# Patient Record
(demographics unavailable — no encounter records)

---

## 2024-11-19 NOTE — PHYSICAL EXAM
[General Appearance - Alert] : alert [General Appearance - In No Acute Distress] : in no acute distress [Oriented To Time, Place, And Person] : oriented to person, place, and time [Impaired Insight] : insight and judgment were intact [Affect] : the affect was normal [Person] : oriented to person [Place] : oriented to place [Time] : oriented to time [Concentration Intact] : normal concentrating ability [Visual Intact] : visual attention was ~T not ~L decreased [Naming Objects] : no difficulty naming common objects [Repeating Phrases] : no difficulty repeating a phrase [Writing A Sentence] : no difficulty writing a sentence [Fluency] : fluency intact [Comprehension] : comprehension intact [Reading] : reading intact [Past History] : adequate knowledge of personal past history [Cranial Nerves Optic (II)] : visual acuity intact bilaterally,  visual fields full to confrontation, pupils equal round and reactive to light [Cranial Nerves Oculomotor (III)] : extraocular motion intact [Cranial Nerves Trigeminal (V)] : facial sensation intact symmetrically [Cranial Nerves Facial (VII)] : face symmetrical [Cranial Nerves Vestibulocochlear (VIII)] : hearing was intact bilaterally [Cranial Nerves Glossopharyngeal (IX)] : tongue and palate midline [Cranial Nerves Accessory (XI - Cranial And Spinal)] : head turning and shoulder shrug symmetric [Cranial Nerves Hypoglossal (XII)] : there was no tongue deviation with protrusion [No Muscle Atrophy] : normal bulk in all four extremities [Hemiparesis Of Left Side] : hemiparesis was present on the left [Motor Strength Upper Extremities Left] : there was weakness of the left upper extremity [Motor Strength Lower Extremities Left] : there was weakness of the left lower extremity [Sensation Tactile Decrease] : light touch was intact [Limited Balance] : the patient's balance was impaired [Past-pointing] : there was no past-pointing [Tremor] : no tremor present [2+] : Ankle jerk left 2+ [Plantar Reflex Right Only] : normal on the right [Plantar Reflex Left Only] : normal on the left [FreeTextEntry1] : left visual field deficit

## 2024-11-19 NOTE — HISTORY OF PRESENT ILLNESS
[FreeTextEntry1] : 72M w/ PMH of HTN, early onset dementia, CAD s/p PCI, CVA s/p residual L sided weakness, BPH, former smoker- COPD is coming in for post hospital discharge follow up for seizures and other complaints.  Patient had coronary stents put in 2021; followed by right MCA infarct - treated in [place]; we do not have records from his hospitalization. Patient had COVID  6/18 - possible seizure - found down by family member - loss of consciousness for unknown period of time - had convulsions noticed by family member - taken to [facility]   11/19/2024 Reports feeling well. Notes compliance with meds. TCD and Doppler noted no stenosis but did have R thyroid nodule and R enlarged lymph node. Has upcoming appt with cardiology next week.

## 2024-11-19 NOTE — DISCUSSION/SUMMARY
[FreeTextEntry1] : 1. Chronic Right MCA infarct - 2022 2.? recent Seizure - on Keppra - will see epilepsy team  3. right MCA? chronic occlusion   stroke Etiology is unknown   Dr.Abraham marks - Neurologist   Return to office for complete evaluation of stroke / etiology etc once records - EEG, Echo, labwork - are received. Continue ASA 81, Keppra - no changes being made to dose as he has not had any recent seizures   11/19/2024 Continue with above noted plan. Will be following up with cardiology for full cardiac evaluation. Discussed diet modifications and importance of medication compliance. Follow up with us in 4-6 months. Should follow up with PCP regarding incidental R thyroid nodule and enlarged lymph node.   [Antithrombotic therapy with ___] : antithrombotic therapy with  [unfilled] [Intensive Blood Pressure Control] : intensive blood pressure control [Lipid Lowering Therapy] : lipid lowering therapy [Patient encouraged to discuss with Primary MD] : I encouraged the patient to discuss these important issues with ~his/her~ primary care doctor [Goals and Counseling] : I have reviewed the goals of stroke risk factor modification. I counseled the patient on measures to reduce stroke risk, including the importance of medication compliance, risk factor control, exercise, healthy diet and avoidance of smoking. I reviewed stroke warning signs and symptoms and appropriate actions to take if such occur.

## 2024-11-19 NOTE — DATA REVIEWED
[de-identified] : [facility] 2024-  Evidence of large prior right MCA infarct involving the cortex of much of the right lateral temporal, frontal, and parietal lobes. There is cystic encephalomalacia, hemosiderin deposition, gliosis, and volume loss. Evidence of chronic lacunar infarct in the left basal ganglia. A few additional areas of T2/FLAIR hyperintensity in subcortical white matter, likely microvascular changes. There is no current evidence of diffusion restriction to suggest acute ischemia. There is no abnormal intraparenchymal enhancement.

## 2024-11-29 NOTE — END OF VISIT
[Time Spent: ___ minutes] : I have spent [unfilled] minutes of time on the encounter which excludes teaching and separately reported services. [TextEntry] : I spent 30 minutes in face-to-face contact with the patient and 15 non face time minutes in support of this visit, including the following: Preparing to see the patient (review of tests/outside records) Obtaining and/or reviewing separately obtained history Counseling and educating the patient/family/caregiver Ordering medications; tests; or procedures, Documenting clinical information in the medical record/chart Independently interpreting results (not separately reported) and communicating results to the patient/family/caregiver

## 2024-11-29 NOTE — CARDIOLOGY SUMMARY
[de-identified] : 11/16/24 Sinus Bradycardia: Voltage criteria for LVH. ST and T wave abnormalities, consider lateral ischemia ABNORMAL  [de-identified] : TTE Report only, 8/27/24  (outside facility): Severe concentric hypertrophy Prominent trabeculations and papillary muscle hypertrophy  LVEF is 40-45% Hypokinesis of anterior and anteroseptal walls Grade II diastolic dysfunction Normal RV size and function Biatrial dilation Moderate AI Small pericardial effusion  [de-identified] : 1/9/2024 LM: cloacal or absent. The LAD and LCx were engaged separately LAD: patent stent at ostium placed 1/2021. 20% stenosis distal to stent Ramus. 30% mid stenosis LCx non dominant. no significant stenosis RCA: dominant. no significant stenosis Dilated aortic root

## 2024-11-29 NOTE — ASSESSMENT
[FreeTextEntry1] : ###  Reports worsening LE edema in the past several months for which he was placed on furosemide which he took for a few weeks perhaps until the prescription ran out. He reports that he has noted some recurrence of the LE edema but not as much as before. As this prescription appears to have written in July and he reports no dyspnea, this may indicate at least from a HF standpoint he is not necessarily loop diuretic dependent.  -Obtain TTE to reassess LV systolic function and aortic regurgitation. Of interest the previous TTE reported significant LVH -continue carvedilol 6.25 mg BID. There is some documentation of suboptimal adherence to this so it may be better to change to metoprolol succinate once daily. Uptitration of beta blockade will likely be limited by his HR -can continue losartan 100 mg daily; however will discuss changing to sacubitril-valsartan if he and family thinks they would do okay with twice daily medication. This may have the same issue as the carvedilol however being BID dosing -can continue amlodipine 5 mg.  -BP goal <130/80. Based on TTE results and standing on BID dosing, can change losartan to entresto or maximize amlodipine. Appears he was amlodipine preceeding onset of LE edema but will need confirm -discussed keeping a BP log  -appears would have run out of rosuvastatin by now based on last script unless getting from elsewhere. Will renew

## 2024-11-29 NOTE — DISCUSSION/SUMMARY
[EKG obtained to assist in diagnosis and management of assessed problem(s)] : EKG obtained to assist in diagnosis and management of assessed problem(s) [___ Week(s)] : in [unfilled] week(s) [FreeTextEntry1] : with TTE done prior. Thank you for the opportunity to participate in the care of ELIZABETH FINCH. Should you have any questions, please do not hesitate to contact me.   Musa Solano MD, [facility]

## 2024-11-29 NOTE — HISTORY OF PRESENT ILLNESS
[FreeTextEntry1] : CARDIOLOGY INITIAL VISIT    Dear Dr. LYNETTE CANDELARIA   I had the pleasure of seeing Mr. ELIZABETH FINCH in cardiology clinic today for coronary artery disease, cardiomyopathy and hypertension   HPI As you are aware, ELIZABETH FINCH is a 73 year male with a history of hypertension, dementia, CAD c/b M in 2021 (he was having chest pain and SOB, he went to urgent care and was referred due to concern for MI and was diagnosed with an MI and had one stent place to the LAD) ischemic cardiomyopathy with some recovery of EF after revascularization, CVA with residual left sided weakness and left drop foot, previous tobacco use, COPD, seizure this year, BPH s/p prostatectomy in 5/2024 He was in [place] at the time of MI, he does not recall which hospital it was.   Connie Dougherty his sister is present with him today. He reports LE edema for the lasted several months. He was placed on furosemide for one month and then came off. He has had recurrent LE edema since being off the furosemide but not as bad. No routine exercise. He does push-ups once in a while. He denies shortness of breath with walking or activity during the day. Denies palpitations. No chest pain or SOB. Denies lightheadedness. Denies blurry vision. Denies chest pain or SOB. Denies palpitations. Denies LE edema. Denies orthopnea or PND.

## 2024-11-29 NOTE — DISCUSSION/SUMMARY
[EKG obtained to assist in diagnosis and management of assessed problem(s)] : EKG obtained to assist in diagnosis and management of assessed problem(s) [___ Week(s)] : in [unfilled] week(s) [FreeTextEntry1] : with TTE done prior. Thank you for the opportunity to participate in the care of ELIZABETH FINCH. Should you have any questions, please do not hesitate to contact me.   Musa Solano MD, [facility]

## 2024-11-29 NOTE — PHYSICAL EXAM
[TextEntry] : Gen: NAD, breathing comfortably HEENT: MMM, anicteric sclera Neck: JVP <10 cm. No carotid bruits Cardiac: RRR, no m/r/g Lungs: CTAB, adequate inspiratory effort. No wheezes or rhonchi Abd: soft, NT/ND. No abd bruit Ext: warm and well perfused. Trace LE edema

## 2024-11-29 NOTE — CARDIOLOGY SUMMARY
[de-identified] : 11/16/24 Sinus Bradycardia: Voltage criteria for LVH. ST and T wave abnormalities, consider lateral ischemia ABNORMAL  [de-identified] : TTE Report only, 8/27/24  (outside facility): Severe concentric hypertrophy Prominent trabeculations and papillary muscle hypertrophy  LVEF is 40-45% Hypokinesis of anterior and anteroseptal walls Grade II diastolic dysfunction Normal RV size and function Biatrial dilation Moderate AI Small pericardial effusion  [de-identified] : 1/9/2024 LM: cloacal or absent. The LAD and LCx were engaged separately LAD: patent stent at ostium placed 1/2021. 20% stenosis distal to stent Ramus. 30% mid stenosis LCx non dominant. no significant stenosis RCA: dominant. no significant stenosis Dilated aortic root

## 2025-01-23 NOTE — DISCUSSION/SUMMARY
[___ Week(s)] : in [unfilled] week(s) [FreeTextEntry1] : Thank you for the opportunity to participate in the care of ELIZABETH FINCH. Should you have any questions, please do not hesitate to contact me.   Musa Solano MD, [facility]

## 2025-01-23 NOTE — DISCUSSION/SUMMARY
[___ Week(s)] : in [unfilled] week(s) [FreeTextEntry1] : Thank you for the opportunity to participate in the care of ELIZABETH FINCH. Should you have any questions, please do not hesitate to contact me.   Musa Solano MD, [facility]

## 2025-01-23 NOTE — HISTORY OF PRESENT ILLNESS
[FreeTextEntry1] : CARDIOLOGY FOLLOW-UP VISIT   Dear Dr. LYNETTE CANDELARIA   I had the pleasure of seeing Mr. ELIZABETH FINCH in cardiology clinic today for coronary artery disease, ischemic cardiomyopathy and hypertension   HPI As you are aware, ELIZABETH FINCH is a 73 year male with a history of hypertension, dementia, CAD c/b MI in 2021 (he was having chest pain and SOB, he went to urgent care and was referred due to concern for MI and was diagnosed with an MI and had one stent place to the LAD in [place]), ischemic cardiomyopathy with some recovery of EF after revascularization, CVA with residual left sided weakness and left drop foot, previous tobacco use, COPD, seizure in 2024, BPH s/p prostatectomy in 5/2024. He was in [place] at the time of MI, he does not recall which hospital it was.   Connie Dougherty his sister is present with him today.   Interval hx: ELIZABETH FINCH's last visit was on 11/26/24. His LE edema is stable off of furosemide. Denies chest pain or SOB. Denies lightheadedness. Denies blurry vision. Still has left sided reduced sensation since the stroke. Denies orthopnea or PND.

## 2025-01-23 NOTE — CARDIOLOGY SUMMARY
[de-identified] : 11/16/24 Sinus Bradycardia: Voltage criteria for LVH. ST and T wave abnormalities, consider lateral ischemia ABNORMAL  [de-identified] : TTE 1/21/25 CONCLUSIONS:  1. Left ventricular systolic function is low normal with an ejection fraction of 54 % by Blank's method of disks. Regional wall motion abnormalities present. 2. There is mild (grade 1) left ventricular diastolic dysfunction. 3. Left atrium is mildly dilated. 4. Mild tricuspid regurgitation. 5. Mild aortic regurgitation. 6. Aortic root at the sinuses of Valsalva is dilated, measuring 4.40 cm (indexed 2.23 cm/m).  TTE Report only, 8/27/24  (outside facility): Severe concentric hypertrophy Prominent trabeculations and papillary muscle hypertrophy  LVEF is 40-45% Hypokinesis of anterior and anteroseptal walls Grade II diastolic dysfunction Normal RV size and function Biatrial dilation Moderate AI Small pericardial effusion  [de-identified] : 1/9/2024 LM: cloacal or absent. The LAD and LCx were engaged separately LAD: patent stent at ostium placed 1/2021. 20% stenosis distal to stent Ramus. 30% mid stenosis LCx non dominant. no significant stenosis RCA: dominant. no significant stenosis Dilated aortic root

## 2025-01-23 NOTE — CARDIOLOGY SUMMARY
[de-identified] : 11/16/24 Sinus Bradycardia: Voltage criteria for LVH. ST and T wave abnormalities, consider lateral ischemia ABNORMAL  [de-identified] : TTE 1/21/25 CONCLUSIONS:  1. Left ventricular systolic function is low normal with an ejection fraction of 54 % by Blank's method of disks. Regional wall motion abnormalities present. 2. There is mild (grade 1) left ventricular diastolic dysfunction. 3. Left atrium is mildly dilated. 4. Mild tricuspid regurgitation. 5. Mild aortic regurgitation. 6. Aortic root at the sinuses of Valsalva is dilated, measuring 4.40 cm (indexed 2.23 cm/m).  TTE Report only, 8/27/24  (outside facility): Severe concentric hypertrophy Prominent trabeculations and papillary muscle hypertrophy  LVEF is 40-45% Hypokinesis of anterior and anteroseptal walls Grade II diastolic dysfunction Normal RV size and function Biatrial dilation Moderate AI Small pericardial effusion  [de-identified] : 1/9/2024 LM: cloacal or absent. The LAD and LCx were engaged separately LAD: patent stent at ostium placed 1/2021. 20% stenosis distal to stent Ramus. 30% mid stenosis LCx non dominant. no significant stenosis RCA: dominant. no significant stenosis Dilated aortic root

## 2025-01-23 NOTE — ASSESSMENT
[FreeTextEntry1] : ###  Ischemic cardiomyopathy -based on prior outside TTE (report only), LVEF has improved on TTE today -change carvedilol 6.25 mg BID to metoprolol succinate 25 mg daily to aid with adherence. Uptitration of beta blockade will likely be limited by his HR -continue losartan 100 mg daily.  -ASA 81 mg lifelong -continue rosuvastatin 20 mg daily -recheck lipids next if not checked through PCP -request records from previous cardiologist in [place]  HTN -change beta blocker as above -continue amlodipine 5 mg daily and losartan 100 mg  -BP goal <130/80.   CVA -unclear circumstances of CVA -place cardiac monitor today to evaluate for occult arrhythmia -goal BP <130/80 as above -continue ASA and high intensity statin   Ascending aorta dilation -see on TTE today. Aortic root 4.4 cm -BP optimization as above -repeat TTE in 6 months to make sure no accelerated growth  AI -mild on TTE today

## 2025-01-23 NOTE — HISTORY OF PRESENT ILLNESS
[FreeTextEntry1] : CARDIOLOGY FOLLOW-UP VISIT   Dear Dr. LYNETTE ACNDELARIA   I had the pleasure of seeing Mr. ELIZABETH FINCH in cardiology clinic today for coronary artery disease, ischemic cardiomyopathy and hypertension   HPI As you are aware, ELIZABETH FINCH is a 73 year male with a history of hypertension, dementia, CAD c/b MI in 2021 (he was having chest pain and SOB, he went to urgent care and was referred due to concern for MI and was diagnosed with an MI and had one stent place to the LAD in [place]), ischemic cardiomyopathy with some recovery of EF after revascularization, CVA with residual left sided weakness and left drop foot, previous tobacco use, COPD, seizure in 2024, BPH s/p prostatectomy in 5/2024. He was in [place] at the time of MI, he does not recall which hospital it was.   Connie Dougherty his sister is present with him today.   Interval hx: ELIZABETH FINCH's last visit was on 11/26/24. His LE edema is stable off of furosemide. Denies chest pain or SOB. Denies lightheadedness. Denies blurry vision. Still has left sided reduced sensation since the stroke. Denies orthopnea or PND.

## 2025-01-23 NOTE — DISCUSSION/SUMMARY
[___ Week(s)] : in [unfilled] week(s) [FreeTextEntry1] : Thank you for the opportunity to participate in the care of ELIZABETH FINCH. Should you have any questions, please do not hesitate to contact me.   Musa Solano MD, [facility]

## 2025-01-23 NOTE — CARDIOLOGY SUMMARY
[de-identified] : 11/16/24 Sinus Bradycardia: Voltage criteria for LVH. ST and T wave abnormalities, consider lateral ischemia ABNORMAL  [de-identified] : TTE 1/21/25 CONCLUSIONS:  1. Left ventricular systolic function is low normal with an ejection fraction of 54 % by Blank's method of disks. Regional wall motion abnormalities present. 2. There is mild (grade 1) left ventricular diastolic dysfunction. 3. Left atrium is mildly dilated. 4. Mild tricuspid regurgitation. 5. Mild aortic regurgitation. 6. Aortic root at the sinuses of Valsalva is dilated, measuring 4.40 cm (indexed 2.23 cm/m).  TTE Report only, 8/27/24  (outside facility): Severe concentric hypertrophy Prominent trabeculations and papillary muscle hypertrophy  LVEF is 40-45% Hypokinesis of anterior and anteroseptal walls Grade II diastolic dysfunction Normal RV size and function Biatrial dilation Moderate AI Small pericardial effusion  [de-identified] : 1/9/2024 LM: cloacal or absent. The LAD and LCx were engaged separately LAD: patent stent at ostium placed 1/2021. 20% stenosis distal to stent Ramus. 30% mid stenosis LCx non dominant. no significant stenosis RCA: dominant. no significant stenosis Dilated aortic root

## 2025-02-20 NOTE — CARDIOLOGY SUMMARY
[de-identified] : 11/16/24 Sinus Bradycardia: Voltage criteria for LVH. ST and T wave abnormalities, consider lateral ischemia ABNORMAL  [de-identified] : TTE 1/21/25 CONCLUSIONS:  1. Left ventricular systolic function is low normal with an ejection fraction of 54 % by Blank's method of disks. Regional wall motion abnormalities present. 2. There is mild (grade 1) left ventricular diastolic dysfunction. 3. Left atrium is mildly dilated. 4. Mild tricuspid regurgitation. 5. Mild aortic regurgitation. 6. Aortic root at the sinuses of Valsalva is dilated, measuring 4.40 cm (indexed 2.23 cm/m).  TTE Report only, 8/27/24  (outside facility): Severe concentric hypertrophy Prominent trabeculations and papillary muscle hypertrophy  LVEF is 40-45% Hypokinesis of anterior and anteroseptal walls Grade II diastolic dysfunction Normal RV size and function Biatrial dilation Moderate AI Small pericardial effusion  [de-identified] : 1/9/2024 LM: cloacal or absent. The LAD and LCx were engaged separately LAD: patent stent at ostium placed 1/2021. 20% stenosis distal to stent Ramus. 30% mid stenosis LCx non dominant. no significant stenosis RCA: dominant. no significant stenosis Dilated aortic root

## 2025-02-20 NOTE — CARDIOLOGY SUMMARY
[de-identified] : 11/16/24 Sinus Bradycardia: Voltage criteria for LVH. ST and T wave abnormalities, consider lateral ischemia ABNORMAL  [de-identified] : Enrollment Period 01/21/25, 02:48pm to 02/04/25, 02:48pm 14 days 0 hours Analysis Time 13 days 6 hours Patient had a min HR of 42 bpm, max HR of 188 bpm, and avg HR of 68 bpm. Predominant underlying rhythm was Sinus Rhythm. 5 Ventricular Tachycardia runs occurred, the run with the fastest interval lasting 20 beats with a max rate of 188 bpm, the longest lasting 9.9 secs with an avg rate of 142 bpm. 16 Supraventricular Tachycardia runs occurred, the run with the fastest interval lasting 6 beats with a max rate of 176 bpm, the longest lasting 10 beats with an avg rate of 121 bpm. Isolated SVEs were rare (<1.0%), SVE Couplets were rare (<1.0%), and SVE Triplets were rare (<1.0%). Isolated VEs were rare (<1.0%, 2952), VE Couplets were rare (<1.0%, 64), and VE Triplets were rare (<1.0%, 4). Ventricular Bigeminy and Trigeminy were present.  [de-identified] : TTE 1/21/25 CONCLUSIONS:  1. Left ventricular systolic function is low normal with an ejection fraction of 54 % by Blank's method of disks. Regional wall motion abnormalities present. 2. There is mild (grade 1) left ventricular diastolic dysfunction. 3. Left atrium is mildly dilated. 4. Mild tricuspid regurgitation. 5. Mild aortic regurgitation. 6. Aortic root at the sinuses of Valsalva is dilated, measuring 4.40 cm (indexed 2.23 cm/m).  TTE Report only, 8/27/24  (outside facility): Severe concentric hypertrophy Prominent trabeculations and papillary muscle hypertrophy  LVEF is 40-45% Hypokinesis of anterior and anteroseptal walls Grade II diastolic dysfunction Normal RV size and function Biatrial dilation Moderate AI Small pericardial effusion  [de-identified] : 1/9/2024 LM: cloacal or absent. The LAD and LCx were engaged separately LAD: patent stent at ostium placed 1/2021. 20% stenosis distal to stent Ramus. 30% mid stenosis LCx non dominant. no significant stenosis RCA: dominant. no significant stenosis Dilated aortic root

## 2025-02-20 NOTE — CARDIOLOGY SUMMARY
[de-identified] : 11/16/24 Sinus Bradycardia: Voltage criteria for LVH. ST and T wave abnormalities, consider lateral ischemia ABNORMAL  [de-identified] : Enrollment Period 01/21/25, 02:48pm to 02/04/25, 02:48pm 14 days 0 hours Analysis Time 13 days 6 hours Patient had a min HR of 42 bpm, max HR of 188 bpm, and avg HR of 68 bpm. Predominant underlying rhythm was Sinus Rhythm. 5 Ventricular Tachycardia runs occurred, the run with the fastest interval lasting 20 beats with a max rate of 188 bpm, the longest lasting 9.9 secs with an avg rate of 142 bpm. 16 Supraventricular Tachycardia runs occurred, the run with the fastest interval lasting 6 beats with a max rate of 176 bpm, the longest lasting 10 beats with an avg rate of 121 bpm. Isolated SVEs were rare (<1.0%), SVE Couplets were rare (<1.0%), and SVE Triplets were rare (<1.0%). Isolated VEs were rare (<1.0%, 2952), VE Couplets were rare (<1.0%, 64), and VE Triplets were rare (<1.0%, 4). Ventricular Bigeminy and Trigeminy were present.  [de-identified] : TTE 1/21/25 CONCLUSIONS:  1. Left ventricular systolic function is low normal with an ejection fraction of 54 % by Blank's method of disks. Regional wall motion abnormalities present. 2. There is mild (grade 1) left ventricular diastolic dysfunction. 3. Left atrium is mildly dilated. 4. Mild tricuspid regurgitation. 5. Mild aortic regurgitation. 6. Aortic root at the sinuses of Valsalva is dilated, measuring 4.40 cm (indexed 2.23 cm/m).  TTE Report only, 8/27/24  (outside facility): Severe concentric hypertrophy Prominent trabeculations and papillary muscle hypertrophy  LVEF is 40-45% Hypokinesis of anterior and anteroseptal walls Grade II diastolic dysfunction Normal RV size and function Biatrial dilation Moderate AI Small pericardial effusion  [de-identified] : 1/9/2024 LM: cloacal or absent. The LAD and LCx were engaged separately LAD: patent stent at ostium placed 1/2021. 20% stenosis distal to stent Ramus. 30% mid stenosis LCx non dominant. no significant stenosis RCA: dominant. no significant stenosis Dilated aortic root

## 2025-02-20 NOTE — DISCUSSION/SUMMARY
[___ Week(s)] : in [unfilled] week(s) [FreeTextEntry1] : Thank you for the opportunity to participate in the care of ELIZABETH FINCH. Should you have any questions, please do not hesitate to contact me.   Musa Solano MD, [facility]

## 2025-02-20 NOTE — DISCUSSION/SUMMARY
[___ Week(s)] : in [unfilled] week(s) [FreeTextEntry1] : Thank you for the opportunity to participate in the care of ELIZABETH FINCH. Should you have any questions, please do not hesitate to contact me.   Musa Solano MD, [facility]

## 2025-02-20 NOTE — HISTORY OF PRESENT ILLNESS
[FreeTextEntry1] : CARDIOLOGY FOLLOW-UP VISIT   Dear Dr. LYNETTE CANDELARIA   I had the pleasure of seeing Mr. ELIZABETH FINCH in cardiology clinic today for coronary artery disease, ischemic cardiomyopathy and hypertension   HPI As you are aware, ELIZABETH FINCH is a 73 year male with a history of hypertension, dementia, CAD c/b MI in 2021 (he was having chest pain and SOB, he went to urgent care and was referred due to concern for MI and was diagnosed with an MI and had one stent place to the LAD in [place]), ischemic cardiomyopathy with some recovery of EF after revascularization, CVA with residual left sided weakness and left drop foot, previous tobacco use, COPD, seizure in 2024, BPH s/p prostatectomy in 5/2024. He was in [place] at the time of MI, he does not recall which hospital it was.   Connie Dougherty his sister is present with him today.   Interval hx: ELIZABETH FINCH's last visit was on 1/21/25. His LE edema is stable off of furosemide. Denies chest pain or SOB. Denies lightheadedness. Denies blurry vision. Still has left sided reduced sensation since the stroke. Denies orthopnea or PND.

## 2025-02-20 NOTE — CARDIOLOGY SUMMARY
[de-identified] : 11/16/24 Sinus Bradycardia: Voltage criteria for LVH. ST and T wave abnormalities, consider lateral ischemia ABNORMAL  [de-identified] : TTE 1/21/25 CONCLUSIONS:  1. Left ventricular systolic function is low normal with an ejection fraction of 54 % by Blank's method of disks. Regional wall motion abnormalities present. 2. There is mild (grade 1) left ventricular diastolic dysfunction. 3. Left atrium is mildly dilated. 4. Mild tricuspid regurgitation. 5. Mild aortic regurgitation. 6. Aortic root at the sinuses of Valsalva is dilated, measuring 4.40 cm (indexed 2.23 cm/m).  TTE Report only, 8/27/24  (outside facility): Severe concentric hypertrophy Prominent trabeculations and papillary muscle hypertrophy  LVEF is 40-45% Hypokinesis of anterior and anteroseptal walls Grade II diastolic dysfunction Normal RV size and function Biatrial dilation Moderate AI Small pericardial effusion  [de-identified] : 1/9/2024 LM: cloacal or absent. The LAD and LCx were engaged separately LAD: patent stent at ostium placed 1/2021. 20% stenosis distal to stent Ramus. 30% mid stenosis LCx non dominant. no significant stenosis RCA: dominant. no significant stenosis Dilated aortic root

## 2025-02-20 NOTE — DISCUSSION/SUMMARY
[___ Week(s)] : in [unfilled] week(s) [FreeTextEntry1] : Thank you for the opportunity to participate in the care of ELIZABETH FINCH. Should you have any questions, please do not hesitate to contact me.   Musa Solano MD, [facility]

## 2025-04-10 NOTE — ASSESSMENT
[FreeTextEntry1] : 73M w/ PMH of HTN, early onset dementia, CAD s/p PCI, CVA s/p residual L sided weakness, seizures, BPH, former smoker- COPD is coming in for testing for parasites.   #Screening for parasitic infection -CBC w/ diff to check eosinophils, stool culture, stool O&P ordered  #HTN -BP acceptable today -C/w current regimen  #Former smoker -Check CT Chest lung cancer screening  #Hepatic lesion -MR Abdomen liver protocol ordered   #Iliac Artery aneurysms -Vascular Surgery consult  #Upcoming travel -Info for [facility] provided   #Constipation -Discussed lifestyle modification (increased fluid intake, increased fiber intake, increased physical activity, etc). -Patient declines miralax chuck  RTC in 3 months for AWV.

## 2025-04-10 NOTE — HISTORY OF PRESENT ILLNESS
[Family Member] : family member [FreeTextEntry1] : 73M w/ PMH of HTN, early onset dementia, CAD s/p PCI, CVA s/p residual L sided weakness, seizures, BPH, former smoker- COPD is coming in for testing for parasites.  [de-identified] : 73M w/ PMH of HTN, early onset dementia, CAD s/p PCI, CVA s/p residual L sided weakness, seizures, BPH, former smoker- COPD is coming in for testing for parasites.   Wants to be tested for parasites. No symptoms.   Complaints of constipation. States last colonoscopy was approximately 2 years ago.  Did not have MR Abdomen that was done last year. CT Pelvis from last year showed b/l iliac artery aneurysms.  Overdue for CT Chest lung cancer screening.  Leaving for [facility] in July

## 2025-04-10 NOTE — PHYSICAL EXAM
[Normal] : no acute distress, well nourished, well developed and well-appearing [No Respiratory Distress] : no respiratory distress  [No Accessory Muscle Use] : no accessory muscle use [Clear to Auscultation] : lungs were clear to auscultation bilaterally [Normal Rate] : normal rate  [Regular Rhythm] : with a regular rhythm [No Focal Deficits] : no focal deficits [Speech Grossly Normal] : speech grossly normal [Normal Affect] : the affect was normal [Normal Mood] : the mood was normal

## 2025-04-29 NOTE — HISTORY OF PRESENT ILLNESS
[Former] : Former [TextBox_13] : Patient is scheduled for a annual  LDCT for lung cancer screening. Chart review performed to confirm eligibility for LDCT.    No documented personal or family history of lung cancer. No documented s/s of lung cancer. Pt is a former smoker (2023) with a 32 pack year hx. [PacksperYear] : 32

## 2025-05-27 NOTE — PHYSICAL EXAM
[General Appearance - Alert] : alert Cold/Sinus [General Appearance - In No Acute Distress] : in no acute distress [Oriented To Time, Place, And Person] : oriented to person, place, and time [Impaired Insight] : insight and judgment were intact [Affect] : the affect was normal [Person] : oriented to person [Place] : oriented to place [Time] : oriented to time [Concentration Intact] : normal concentrating ability [Visual Intact] : visual attention was ~T not ~L decreased [Naming Objects] : no difficulty naming common objects [Repeating Phrases] : no difficulty repeating a phrase [Writing A Sentence] : no difficulty writing a sentence [Fluency] : fluency intact [Comprehension] : comprehension intact [Reading] : reading intact [Past History] : adequate knowledge of personal past history [Cranial Nerves Optic (II)] : visual acuity intact bilaterally,  visual fields full to confrontation, pupils equal round and reactive to light [Cranial Nerves Oculomotor (III)] : extraocular motion intact [Cranial Nerves Trigeminal (V)] : facial sensation intact symmetrically [Cranial Nerves Facial (VII)] : face symmetrical [Cranial Nerves Vestibulocochlear (VIII)] : hearing was intact bilaterally [Cranial Nerves Glossopharyngeal (IX)] : tongue and palate midline [Cranial Nerves Accessory (XI - Cranial And Spinal)] : head turning and shoulder shrug symmetric [Cranial Nerves Hypoglossal (XII)] : there was no tongue deviation with protrusion [No Muscle Atrophy] : normal bulk in all four extremities [Hemiparesis Of Left Side] : hemiparesis was present on the left [Motor Strength Upper Extremities Left] : there was weakness of the left upper extremity [Motor Strength Lower Extremities Left] : there was weakness of the left lower extremity [Sensation Tactile Decrease] : light touch was intact [Limited Balance] : the patient's balance was impaired [2+] : Ankle jerk left 2+ [Past-pointing] : there was no past-pointing [Tremor] : no tremor present [Plantar Reflex Right Only] : normal on the right [Plantar Reflex Left Only] : normal on the left [FreeTextEntry1] : left visual field deficit

## 2025-05-27 NOTE — DISCUSSION/SUMMARY
[Antithrombotic therapy with ___] : antithrombotic therapy with  [unfilled] [Intensive Blood Pressure Control] : intensive blood pressure control [Lipid Lowering Therapy] : lipid lowering therapy [Patient encouraged to discuss with Primary MD] : I encouraged the patient to discuss these important issues with ~his/her~ primary care doctor [Goals and Counseling] : I have reviewed the goals of stroke risk factor modification. I counseled the patient on measures to reduce stroke risk, including the importance of medication compliance, risk factor control, exercise, healthy diet and avoidance of smoking. I reviewed stroke warning signs and symptoms and appropriate actions to take if such occur. [FreeTextEntry1] : 1. Chronic Right MCA infarct - 2022 2.? recent Seizure - on Keppra - will see epilepsy team  3. right MCA? chronic occlusion   stroke Etiology is unknown   Dr.Abraham marks - Neurologist   Return to office for complete evaluation of stroke / etiology etc once records - EEG, Echo, labwork - are received. Continue ASA 81, Keppra - no changes being made to dose as he has not had any recent seizures   11/19/2024 Continue with above noted plan. Will be following up with cardiology for full cardiac evaluation. Discussed diet modifications and importance of medication compliance. Follow up with us in 4-6 months. Should follow up with PCP regarding incidental R thyroid nodule and enlarged lymph node.   5/27/2025 H/o episodes where his brain is "empty" No seizure like episodes or loss of consciousness - recommend he see Epilepsy first available Family concerned about Gait  PT / OT  ? anhedonia - see Psychology for adjustment disorder with depressed mood  Bogdan will give info abut support stroke group

## 2025-05-27 NOTE — HISTORY OF PRESENT ILLNESS
[FreeTextEntry1] : 72M w/ PMH of HTN, early onset dementia, CAD s/p PCI, CVA s/p residual L sided weakness, BPH, former smoker- COPD is coming in for post hospital discharge follow up for seizures and other complaints.  Patient had coronary stents put in 2021; followed by right MCA infarct - treated in [place]; we do not have records from his hospitalization. Patient had COVID  6/18 - possible seizure - found down by family member - loss of consciousness for unknown period of time - had convulsions noticed by family member - taken to [facility]   11/19/2024 Reports feeling well. Notes compliance with meds. TCD and Doppler noted no stenosis but did have R thyroid nodule and R enlarged lymph node. Has upcoming appt with cardiology next week.   5/27/2025 H/o episodes where his brain is "empty" No seizure like episodes or loss of consciousness - recommend he see Epilepsy first available Family concerned about Gait

## 2025-05-27 NOTE — DATA REVIEWED
[de-identified] : [facility] 2024-  Evidence of large prior right MCA infarct involving the cortex of much of the right lateral temporal, frontal, and parietal lobes. There is cystic encephalomalacia, hemosiderin deposition, gliosis, and volume loss. Evidence of chronic lacunar infarct in the left basal ganglia. A few additional areas of T2/FLAIR hyperintensity in subcortical white matter, likely microvascular changes. There is no current evidence of diffusion restriction to suggest acute ischemia. There is no abnormal intraparenchymal enhancement.